# Patient Record
Sex: MALE | Race: WHITE
[De-identification: names, ages, dates, MRNs, and addresses within clinical notes are randomized per-mention and may not be internally consistent; named-entity substitution may affect disease eponyms.]

---

## 2024-04-08 PROBLEM — Z00.00 ENCOUNTER FOR PREVENTIVE HEALTH EXAMINATION: Status: ACTIVE | Noted: 2024-04-08

## 2024-04-18 ENCOUNTER — APPOINTMENT (OUTPATIENT)
Dept: ORTHOPEDIC SURGERY | Facility: CLINIC | Age: 54
End: 2024-04-18

## 2024-04-18 DIAGNOSIS — I10 ESSENTIAL (PRIMARY) HYPERTENSION: ICD-10-CM

## 2024-04-18 DIAGNOSIS — E78.00 PURE HYPERCHOLESTEROLEMIA, UNSPECIFIED: ICD-10-CM

## 2024-04-18 PROCEDURE — 20606 DRAIN/INJ JOINT/BURSA W/US: CPT | Mod: 1L,RT

## 2024-04-18 PROCEDURE — 99204 OFFICE O/P NEW MOD 45 MIN: CPT | Mod: 1L

## 2024-04-18 PROCEDURE — 73630 X-RAY EXAM OF FOOT: CPT | Mod: 1L,RT

## 2024-04-18 PROCEDURE — J3490M: CUSTOM | Mod: 1L

## 2024-04-18 PROCEDURE — 73600 X-RAY EXAM OF ANKLE: CPT | Mod: 1L,RT

## 2024-04-18 PROCEDURE — 76942 ECHO GUIDE FOR BIOPSY: CPT | Mod: 1L,25

## 2024-04-18 RX ORDER — ROSUVASTATIN CALCIUM 5 MG/1
TABLET, FILM COATED ORAL
Refills: 0 | Status: ACTIVE | COMMUNITY

## 2024-04-18 RX ORDER — MELOXICAM 15 MG/1
15 TABLET ORAL DAILY
Qty: 30 | Refills: 2 | Status: ACTIVE | COMMUNITY
Start: 2024-04-18 | End: 1900-01-01

## 2024-04-18 RX ORDER — LOSARTAN POTASSIUM 100 MG/1
TABLET, FILM COATED ORAL
Refills: 0 | Status: ACTIVE | COMMUNITY

## 2024-04-18 NOTE — PHYSICAL EXAM
[NL (40)] : plantar flexion 40 degrees [5___] : eversion 5[unfilled]/5 [2+] : posterior tibialis pulse: 2+ [Normal] : saphenous nerve sensation normal [] : patient ambulates without assistive device [There are no fractures, subluxations or dislocations. No significant abnormalities are seen] : There are no fractures, subluxations or dislocations. No significant abnormalities are seen [Right] : right foot [Weight -] : weightbearing [de-identified] : Subtalar degenerative changes, worse when compared to 2018 films.  [de-identified] : inversion 10 degrees [TWNoteComboBox7] : dorsiflexion 15 degrees [de-identified] : eversion 10 degrees
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
## 2024-04-18 NOTE — PROCEDURE
[FreeTextEntry3] : Patient has tried OTC's including aspirin, Ibuprofen, Aleve, etc or prescription NSAIDS, and/or exercises at home and/or physical therapy without satisfactory response and the risks benefits, and alternatives have been discussed, and verbal consent was obtained.  The risks, benefits and contents of the injection have been discussed.  Risks include but are not limited to allergic reaction, flare reaction, permanent white skin discoloration at the injection site and infection.  The patient understands the risks and agrees to having the injection.  All questions have been answered.  An injection of the right subtalar joint was performed. The indication for this procedure was pain and inflammation. The site was prepped with alcohol and sterile technique used. An injection of 1cc of Lidocaine 1% , 1cc of Ropivacaine  0.5% ,and 1cc of 80mg Methylprednisolone (Depomedrol) was used. Patient tolerated procedure well. Patient was advised to call if redness, pain, or fever occur, apply ice for 15 minutes out of every hour for the next 12-24 hours as tolerated and patient was advised to rest the joint(s) for 3 days.  Ultrasound guidance was indicated for this patient due to erosive arthritis. All ultrasound images have been permanently captured and stored accordingly in our picture archiving and communication system. Visualization of the needle and placement of injection was performed without complication.

## 2024-04-18 NOTE — DISCUSSION/SUMMARY
[de-identified] : Discussed treatment options, both operative and nonoperative.  WB in supportive footwear. Ice to affected area. NSAIDS prn. Discussed steroid vs. visco injections. Bracing discussed. Subtalar arthrodesis discussed.

## 2024-05-16 ENCOUNTER — APPOINTMENT (OUTPATIENT)
Dept: ORTHOPEDIC SURGERY | Facility: CLINIC | Age: 54
End: 2024-05-16

## 2024-05-30 ENCOUNTER — APPOINTMENT (OUTPATIENT)
Dept: ORTHOPEDIC SURGERY | Facility: CLINIC | Age: 54
End: 2024-05-30
Payer: COMMERCIAL

## 2024-05-30 VITALS — HEIGHT: 66 IN | BODY MASS INDEX: 34.87 KG/M2 | WEIGHT: 217 LBS

## 2024-05-30 DIAGNOSIS — M24.471 RECURRENT DISLOCATION, RIGHT ANKLE: ICD-10-CM

## 2024-05-30 DIAGNOSIS — M19.071 PRIMARY OSTEOARTHRITIS, RIGHT ANKLE AND FOOT: ICD-10-CM

## 2024-05-30 PROCEDURE — 99213 OFFICE O/P EST LOW 20 MIN: CPT

## 2024-05-30 NOTE — HISTORY OF PRESENT ILLNESS
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  He had a steroid injection on 4/18/24.  He reports significant improvement and is very happy with his progress. [] : no

## 2024-05-30 NOTE — DISCUSSION/SUMMARY
[de-identified] : Patient is doing much better in regards to his OA.  He is encouraged to do home ROM exercises. He states his subluxating peroneal tendons have been going on for a very long time and do not bother him.  He was explained that this puts him at risk of tendon tearing over time.  Since it is chronic and is not causing symptoms, we will just observe for now.  Follow up if symptoms develop.

## 2024-05-30 NOTE — PHYSICAL EXAM
[NL (40)] : plantar flexion 40 degrees [5___] : eversion 5[unfilled]/5 [2+] : posterior tibialis pulse: 2+ [Normal] : saphenous nerve sensation normal [There are no fractures, subluxations or dislocations. No significant abnormalities are seen] : There are no fractures, subluxations or dislocations. No significant abnormalities are seen [Right] : right foot [Weight -] : weightbearing [de-identified] : Subtalar degenerative changes, worse when compared to 2018 films.  [NL (20)] : eversion 20 degrees [] : non-antalgic [TWNoteComboBox7] : dorsiflexion 15 degrees [de-identified] : inversion 20 degrees

## 2024-07-18 ENCOUNTER — TRANSCRIPTION ENCOUNTER (OUTPATIENT)
Age: 54
End: 2024-07-18

## 2024-09-26 ENCOUNTER — APPOINTMENT (OUTPATIENT)
Dept: ORTHOPEDIC SURGERY | Facility: CLINIC | Age: 54
End: 2024-09-26
Payer: COMMERCIAL

## 2024-09-26 VITALS — WEIGHT: 217 LBS | HEIGHT: 66 IN | BODY MASS INDEX: 34.87 KG/M2

## 2024-09-26 DIAGNOSIS — M24.471 RECURRENT DISLOCATION, RIGHT ANKLE: ICD-10-CM

## 2024-09-26 DIAGNOSIS — M19.071 PRIMARY OSTEOARTHRITIS, RIGHT ANKLE AND FOOT: ICD-10-CM

## 2024-09-26 PROCEDURE — J3490M: CUSTOM | Mod: NC

## 2024-09-26 PROCEDURE — 99213 OFFICE O/P EST LOW 20 MIN: CPT | Mod: 25

## 2024-09-26 PROCEDURE — 20606 DRAIN/INJ JOINT/BURSA W/US: CPT | Mod: RT

## 2024-09-26 NOTE — PROCEDURE
[FreeTextEntry3] : Patient has tried OTC's including aspirin, Ibuprofen, Aleve, etc or prescription NSAIDS, and/or exercises at home and/or physical therapy without satisfactory response. A steroid injection to suppress acute inflammation was discussed. Tthe risks benefits, and alternatives have been discussed, and verbal consent was obtained.   The risks, benefits and contents of the injection have been discussed.  Risks include but are not limited to allergic reaction, flare reaction, permanent white skin discoloration at the injection site and infection.  The patient understands the risks and agrees to having the injection.  All questions have been answered.   An injection of the right subtalar joint was performed. The indication for this procedure was pain and inflammation. The site was prepped with alcohol and sterile technique used. An injection of 1cc of Lidocaine 1% , 1cc of Ropivacaine  0.5% ,and 1cc of 80mg Methylprednisolone (Depomedrol) was used. Patient tolerated procedure well. Patient was advised to call if redness, pain, or fever occur, apply ice for 15 minutes out of every hour for the next 12-24 hours as tolerated and patient was advised to rest the joint(s) for 3 days.   Ultrasound guidance was indicated for this patient due to erosive arthritis . All ultrasound images have been permanently captured and stored accordingly in our picture archiving and communication system. Visualization of the needle and placement of injection was performed without complication.

## 2024-09-26 NOTE — DISCUSSION/SUMMARY
[de-identified] : Patient has a recurrence of his arthritic symptoms. Repeat steroid injection offered and he would like to proceed.  He states his subluxating peroneal tendons have been going on for a very long time and do not bother him.  He was explained that this puts him at risk of tendon tearing over time.  Since it is chronic and is not causing symptoms, we will just observe for now.

## 2024-09-26 NOTE — HISTORY OF PRESENT ILLNESS
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  He had a steroid injection on 4/18/24 which gave great relief.  He states that his pain has been progressively getting worse again over the past weeks.  No recent trauma.  He has a history of subluxating peroneal tendons. [] : no [FreeTextEntry7] : left knee when walking/ limping [FreeTextEntry9] : cortiosne injections

## 2024-09-26 NOTE — PHYSICAL EXAM
[Right] : right foot and ankle [NL (40)] : plantar flexion 40 degrees [NL (20)] : eversion 20 degrees [5___] : eversion 5[unfilled]/5 [2+] : posterior tibialis pulse: 2+ [Normal] : saphenous nerve sensation normal [] : non-antalgic [TWNoteComboBox7] : dorsiflexion 15 degrees [de-identified] : inversion 20 degrees

## 2025-03-19 ENCOUNTER — APPOINTMENT (OUTPATIENT)
Dept: ORTHOPEDIC SURGERY | Facility: CLINIC | Age: 55
End: 2025-03-19
Payer: COMMERCIAL

## 2025-03-19 DIAGNOSIS — M19.071 PRIMARY OSTEOARTHRITIS, RIGHT ANKLE AND FOOT: ICD-10-CM

## 2025-03-19 PROCEDURE — 20606 DRAIN/INJ JOINT/BURSA W/US: CPT | Mod: RT

## 2025-03-19 PROCEDURE — 99213 OFFICE O/P EST LOW 20 MIN: CPT | Mod: 25

## 2025-03-19 PROCEDURE — 73610 X-RAY EXAM OF ANKLE: CPT | Mod: RT

## 2025-03-19 PROCEDURE — J3490M: CUSTOM | Mod: NC,JZ

## 2025-04-17 ENCOUNTER — APPOINTMENT (OUTPATIENT)
Dept: ORTHOPEDIC SURGERY | Facility: CLINIC | Age: 55
End: 2025-04-17
Payer: COMMERCIAL

## 2025-04-17 DIAGNOSIS — M24.471 RECURRENT DISLOCATION, RIGHT ANKLE: ICD-10-CM

## 2025-04-17 DIAGNOSIS — M19.071 PRIMARY OSTEOARTHRITIS, RIGHT ANKLE AND FOOT: ICD-10-CM

## 2025-04-17 PROCEDURE — 99214 OFFICE O/P EST MOD 30 MIN: CPT

## 2025-04-24 ENCOUNTER — APPOINTMENT (OUTPATIENT)
Dept: CT IMAGING | Facility: CLINIC | Age: 55
End: 2025-04-24
Payer: COMMERCIAL

## 2025-04-24 PROCEDURE — 76376 3D RENDER W/INTRP POSTPROCES: CPT | Mod: RT

## 2025-04-24 PROCEDURE — 73700 CT LOWER EXTREMITY W/O DYE: CPT | Mod: RT

## 2025-05-27 ENCOUNTER — OUTPATIENT (OUTPATIENT)
Dept: OUTPATIENT SERVICES | Facility: HOSPITAL | Age: 55
LOS: 1 days | End: 2025-05-27

## 2025-05-27 VITALS
SYSTOLIC BLOOD PRESSURE: 142 MMHG | TEMPERATURE: 99 F | WEIGHT: 197.98 LBS | RESPIRATION RATE: 18 BRPM | DIASTOLIC BLOOD PRESSURE: 88 MMHG | OXYGEN SATURATION: 96 % | HEART RATE: 88 BPM | HEIGHT: 66 IN

## 2025-05-27 DIAGNOSIS — Z98.890 OTHER SPECIFIED POSTPROCEDURAL STATES: Chronic | ICD-10-CM

## 2025-05-27 DIAGNOSIS — I10 ESSENTIAL (PRIMARY) HYPERTENSION: ICD-10-CM

## 2025-05-27 DIAGNOSIS — Z01.818 ENCOUNTER FOR OTHER PREPROCEDURAL EXAMINATION: ICD-10-CM

## 2025-05-27 DIAGNOSIS — M24.471 RECURRENT DISLOCATION, RIGHT ANKLE: ICD-10-CM

## 2025-05-27 LAB
ANION GAP SERPL CALC-SCNC: 5 MMOL/L — SIGNIFICANT CHANGE UP (ref 5–17)
APTT BLD: 33.9 SEC — SIGNIFICANT CHANGE UP (ref 26.1–36.8)
BLD GP AB SCN SERPL QL: SIGNIFICANT CHANGE UP
BUN SERPL-MCNC: 20 MG/DL — SIGNIFICANT CHANGE UP (ref 7–23)
CALCIUM SERPL-MCNC: 9.4 MG/DL — SIGNIFICANT CHANGE UP (ref 8.5–10.1)
CHLORIDE SERPL-SCNC: 109 MMOL/L — HIGH (ref 96–108)
CO2 SERPL-SCNC: 25 MMOL/L — SIGNIFICANT CHANGE UP (ref 22–31)
CREAT SERPL-MCNC: 1.1 MG/DL — SIGNIFICANT CHANGE UP (ref 0.5–1.3)
EGFR: 79 ML/MIN/1.73M2 — SIGNIFICANT CHANGE UP
EGFR: 79 ML/MIN/1.73M2 — SIGNIFICANT CHANGE UP
GLUCOSE SERPL-MCNC: 87 MG/DL — SIGNIFICANT CHANGE UP (ref 70–99)
HCT VFR BLD CALC: 46.2 % — SIGNIFICANT CHANGE UP (ref 39–50)
HGB BLD-MCNC: 15.3 G/DL — SIGNIFICANT CHANGE UP (ref 13–17)
INR BLD: 1 RATIO — SIGNIFICANT CHANGE UP (ref 0.85–1.16)
MCHC RBC-ENTMCNC: 30.4 PG — SIGNIFICANT CHANGE UP (ref 27–34)
MCHC RBC-ENTMCNC: 33.1 G/DL — SIGNIFICANT CHANGE UP (ref 32–36)
MCV RBC AUTO: 91.7 FL — SIGNIFICANT CHANGE UP (ref 80–100)
NRBC BLD AUTO-RTO: 0 /100 WBCS — SIGNIFICANT CHANGE UP (ref 0–0)
PLATELET # BLD AUTO: 155 K/UL — SIGNIFICANT CHANGE UP (ref 150–400)
POTASSIUM SERPL-MCNC: 4 MMOL/L — SIGNIFICANT CHANGE UP (ref 3.5–5.3)
POTASSIUM SERPL-SCNC: 4 MMOL/L — SIGNIFICANT CHANGE UP (ref 3.5–5.3)
PROTHROM AB SERPL-ACNC: 11.6 SEC — SIGNIFICANT CHANGE UP (ref 9.9–13.4)
RBC # BLD: 5.04 M/UL — SIGNIFICANT CHANGE UP (ref 4.2–5.8)
RBC # FLD: 15.4 % — HIGH (ref 10.3–14.5)
SODIUM SERPL-SCNC: 139 MMOL/L — SIGNIFICANT CHANGE UP (ref 135–145)
WBC # BLD: 5.66 K/UL — SIGNIFICANT CHANGE UP (ref 3.8–10.5)
WBC # FLD AUTO: 5.66 K/UL — SIGNIFICANT CHANGE UP (ref 3.8–10.5)

## 2025-05-27 NOTE — H&P PST ADULT - NSICDXPASTSURGICALHX_GEN_ALL_CORE_FT
PAST SURGICAL HISTORY:  H/O carpal tunnel repair     History of ankle surgery     S/P cholecystectomy     S/P hardware removal     S/P ORIF (open reduction internal fixation) fracture

## 2025-05-27 NOTE — H&P PST ADULT - PROBLEM SELECTOR PLAN 1
right foot subtalar arthrodesis and peroneal groove deepening and indicated procedures  Pre-op instructions given, patient verbalized understanding  Chlorhexidine wash instructions given   medical clearance  Anesthesiologist to review PST labs, EKG, required clearances and optimization for surgery.

## 2025-05-27 NOTE — H&P PST ADULT - HISTORY OF PRESENT ILLNESS
54M pmh htn, HLD, recurrent dislocation of right ankle here for Right foot subtalar arthrodesis and peroneal groove deepening with Dr. Chirinos on 06- 54M pmh htn, HLD, recurrent pain of right ankle subluxation of peroneal tendons here for Right foot subtalar arthrodesis and peroneal groove deepening with Dr. Chirinos on 06-

## 2025-05-27 NOTE — H&P PST ADULT - ASSESSMENT
54M pmh htn, HLD, recurrent dislocation of right ankle here for Right foot subtalar arthrodesis and peroneal groove deepening with Dr. Chirinos on 06-  CAPRINI SCORE    AGE RELATED RISK FACTORS                                                             [x ] Age 41-60 years                                            (1 Point)  [ ] Age: 61-74 years                                           (2 Points)                 [ ] Age= 75 years                                                (3 Points)             DISEASE RELATED RISK FACTORS                                                       [ ] Edema in the lower extremities                 (1 Point)                     [ ] Varicose veins                                               (1 Point)                                 [x ] BMI > 25 Kg/m2                                            (1 Point)                                  [ ] Serious infection (ie PNA)                            (1 Point)                     [ ] Lung disease ( COPD, Emphysema)            (1 Point)                                                                          [ ] Acute myocardial infarction                         (1 Point)                  [ ] Congestive heart failure (in the previous month)  (1 Point)         [ ] Inflammatory bowel disease                            (1 Point)                  [ ] Central venous access, PICC or Port               (2 points)       (within the last month)                                                                [ ] Stroke (in the previous month)                        (5 Points)    [ ] Previous or present malignancy                       (2 points)                                                                                                                                                         HEMATOLOGY RELATED FACTORS                                                         [ ] Prior episodes of VTE                                     (3 Points)                     [ ] Positive family history for VTE                      (3 Points)                  [ ] Prothrombin 11152 A                                     (3 Points)                     [ ] Factor V Leiden                                                (3 Points)                        [ ] Lupus anticoagulants                                      (3 Points)                                                           [ ] Anticardiolipin antibodies                              (3 Points)                                                       [ ] High homocysteine in the blood                   (3 Points)                                             [ ] Other congenital or acquired thrombophilia      (3 Points)                                                [ ] Heparin induced thrombocytopenia                  (3 Points)                                        MOBILITY RELATED FACTORS  [ ] Bed rest                                                         (1 Point)  [ ] Plaster cast                                                    (2 points)  [ ] Bed bound for more than 72 hours           (2 Points)    GENDER SPECIFIC FACTORS  [ ] Pregnancy or had a baby within the last month   (1 Point)  [ ] Post-partum < 6 weeks                                   (1 Point)  [ ] Hormonal therapy  or oral contraception   (1 Point)  [ ] History of pregnancy complications              (1 point)  [ ] Unexplained or recurrent              (1 Point)    OTHER RISK FACTORS                                           (1 Point)  [ ] BMI >40, smoking, diabetes requiring insulin, chemotherapy  blood transfusions and length of surgery over 2 hours    SURGERY RELATED RISK FACTORS  [ ]  Section within the last month     (1 Point)  [ ] Minor surgery                                                  (1 Point)  [ ] Arthroscopic surgery                                       (2 Points)  [x ] Planned major surgery lasting more            (2 Points)      than 45 minutes     [ ] Elective hip or knee joint replacement       (5 points)       surgery                                                TRAUMA RELATED RISK FACTORS  [ ] Fracture of the hip, pelvis, or leg                       (5 Points)  [ ] Spinal cord injury resulting in paralysis             (5 points)       (in the previous month)    [ ] Paralysis  (less than 1 month)                             (5 Points)  [ ] Multiple Trauma within 1 month                        (5 Points)    Total Score [    4   ]    Caprini Score 0-2: Low Risk, NO VTE prophylaxis required for most patients, encourage ambulation  Caprini Score 3-6: Moderate Risk , pharmacologic VTE prophylaxis is indicated for most patients (in the absence of contraindications)  Caprini Score Greater than or =7: High risk, pharmocologic VTE prophylaxis indicated for most patients (in the absence of contraindications)

## 2025-05-28 LAB
A1C WITH ESTIMATED AVERAGE GLUCOSE RESULT: 5.4 % — SIGNIFICANT CHANGE UP (ref 4–5.6)
ESTIMATED AVERAGE GLUCOSE: 108 MG/DL — SIGNIFICANT CHANGE UP (ref 68–114)
MRSA PCR RESULT.: SIGNIFICANT CHANGE UP
S AUREUS DNA NOSE QL NAA+PROBE: SIGNIFICANT CHANGE UP
VIT D25+D1,25 OH+D1,25 PNL SERPL-MCNC: 68.7 PG/ML — SIGNIFICANT CHANGE UP (ref 19.9–79.3)

## 2025-06-09 RX ORDER — HYDROCODONE BITARTRATE AND ACETAMINOPHEN 7.5; 325 MG/1; MG/1
7.5-325 TABLET ORAL
Qty: 42 | Refills: 0 | Status: ACTIVE | COMMUNITY
Start: 2025-06-09

## 2025-06-10 ENCOUNTER — OUTPATIENT (OUTPATIENT)
Dept: OUTPATIENT SERVICES | Facility: HOSPITAL | Age: 55
LOS: 1 days | End: 2025-06-10
Payer: COMMERCIAL

## 2025-06-10 ENCOUNTER — TRANSCRIPTION ENCOUNTER (OUTPATIENT)
Age: 55
End: 2025-06-10

## 2025-06-10 ENCOUNTER — RESULT REVIEW (OUTPATIENT)
Age: 55
End: 2025-06-10

## 2025-06-10 ENCOUNTER — APPOINTMENT (OUTPATIENT)
Dept: ORTHOPEDIC SURGERY | Facility: HOSPITAL | Age: 55
End: 2025-06-10
Payer: COMMERCIAL

## 2025-06-10 VITALS
SYSTOLIC BLOOD PRESSURE: 134 MMHG | OXYGEN SATURATION: 99 % | RESPIRATION RATE: 16 BRPM | WEIGHT: 197.98 LBS | HEART RATE: 58 BPM | DIASTOLIC BLOOD PRESSURE: 78 MMHG | TEMPERATURE: 98 F | HEIGHT: 66 IN

## 2025-06-10 VITALS
DIASTOLIC BLOOD PRESSURE: 78 MMHG | RESPIRATION RATE: 16 BRPM | OXYGEN SATURATION: 98 % | SYSTOLIC BLOOD PRESSURE: 120 MMHG

## 2025-06-10 DIAGNOSIS — Z98.890 OTHER SPECIFIED POSTPROCEDURAL STATES: Chronic | ICD-10-CM

## 2025-06-10 PROCEDURE — 73610 X-RAY EXAM OF ANKLE: CPT | Mod: 26

## 2025-06-10 PROCEDURE — 88304 TISSUE EXAM BY PATHOLOGIST: CPT | Mod: 26

## 2025-06-10 PROCEDURE — 27676 REPAIR LOWER LEG TENDONS: CPT | Mod: RT

## 2025-06-10 PROCEDURE — 27658 REPAIR OF LEG TENDON EACH: CPT | Mod: RT

## 2025-06-10 PROCEDURE — 28725 ARTHRODESIS SUBTALAR: CPT | Mod: RT

## 2025-06-10 PROCEDURE — 27680 RELEASE OF LOWER LEG TENDON: CPT | Mod: RT

## 2025-06-10 DEVICE — IMPLANTABLE DEVICE: Type: IMPLANTABLE DEVICE | Site: RIGHT | Status: FUNCTIONAL

## 2025-06-10 DEVICE — GRAFT BONE AUGMENT INJ 3ML SYNTHETIC: Type: IMPLANTABLE DEVICE | Site: RIGHT | Status: FUNCTIONAL

## 2025-06-10 RX ORDER — HYDROMORPHONE/SOD CHLOR,ISO/PF 2 MG/10 ML
0.5 SYRINGE (ML) INJECTION
Refills: 0 | Status: DISCONTINUED | OUTPATIENT
Start: 2025-06-10 | End: 2025-06-10

## 2025-06-10 RX ORDER — B1/B2/B3/B5/B6/B12/VIT C/FOLIC 500-0.5 MG
1 TABLET ORAL
Refills: 0 | DISCHARGE

## 2025-06-10 RX ORDER — LOSARTAN POTASSIUM 100 MG/1
1 TABLET, FILM COATED ORAL
Refills: 0 | DISCHARGE

## 2025-06-10 RX ORDER — ROSUVASTATIN CALCIUM 20 MG/1
1 TABLET, FILM COATED ORAL
Refills: 0 | DISCHARGE

## 2025-06-10 RX ORDER — SODIUM CHLORIDE 9 G/1000ML
1000 INJECTION, SOLUTION INTRAVENOUS
Refills: 0 | Status: DISCONTINUED | OUTPATIENT
Start: 2025-06-10 | End: 2025-06-10

## 2025-06-10 RX ORDER — ONDANSETRON HCL/PF 4 MG/2 ML
4 VIAL (ML) INJECTION ONCE
Refills: 0 | Status: DISCONTINUED | OUTPATIENT
Start: 2025-06-10 | End: 2025-06-10

## 2025-06-10 RX ORDER — ERGOCALCIFEROL 1.25 MG/1
1 CAPSULE ORAL
Refills: 0 | DISCHARGE

## 2025-06-10 RX ORDER — TESTOSTERONE 5.5 MG/1
0 GEL NASAL
Refills: 0 | DISCHARGE

## 2025-06-10 NOTE — ASU DISCHARGE PLAN (ADULT/PEDIATRIC) - FINANCIAL ASSISTANCE
[facility] provides services at a reduced cost to those who are determined to be eligible through [facility]’s financial assistance program. Information regarding [facility]’s financial assistance program can be found by going to [email or web address] or by calling [phone].

## 2025-06-10 NOTE — ASU DISCHARGE PLAN (ADULT/PEDIATRIC) - PROCEDURE
s/p R foot STJ fusion, SPR repair, fib groove depending, fibularis longus and brevis tenosynovectomy

## 2025-06-10 NOTE — ASU PREOP CHECKLIST -  HIBICLENS SHOWER 2 DATE
Problem Dx: HR ALL    Protocol: [id number]  Cycle: IM1  Day: 2  Interval History: no overnight events.    Change from previous past medical, family or social history:	[x] No	[] Yes:    REVIEW OF SYSTEMS  All review of systems negative, except for those marked:  General:		[] Abnormal:  Pulmonary:		[] Abnormal:  Cardiac:		[] Abnormal:  Gastrointestinal:	            [] Abnormal:  ENT:			[] Abnormal:  Renal/Urologic:		[] Abnormal:  Musculoskeletal		[] Abnormal:  Endocrine:		[] Abnormal:  Hematologic:		[x] Abnormal: ALL  Neurologic:		[] Abnormal:  Skin:			[] Abnormal:  Allergy/Immune		[] Abnormal:  Psychiatric:		[] Abnormal:      Allergies    No Known Allergies    Intolerances      chlorhexidine 2% Topical Cloths - Peds 1 Application(s) Topical daily  cholecalciferol Oral Tab/Cap - Peds 1000 Unit(s) Oral daily  clotrimazole  Oral Lozenge - Peds 1 Lozenge Oral two times a day  dextrose 5% + sodium chloride 0.45% - Pediatric 1000 milliLiter(s) IV Continuous <Continuous>  dextrose 5% + sodium chloride 0.45% - Pediatric 1000 milliLiter(s) IV Continuous <Continuous>  famotidine IV Intermittent - Peds 9 milliGRAM(s) IV Intermittent every 12 hours  fenofibrate Oral Tab/Cap - Peds 54 milliGRAM(s) Oral daily  furosemide  IV Push - Peds 18 milliGRAM(s) IV Push once PRN  heparin flush 100 Units/mL IntraVenous Injection - Peds 5 milliLiter(s) IV Push daily PRN  hydrOXYzine IV Intermittent - Peds. 18.5 milliGRAM(s) IV Intermittent every 6 hours PRN  levOCARNitine  Oral Liquid - Peds 1000 milliGRAM(s) Oral two times a day with meals  LORazepam  Oral Liquid - Peds 0.9 milliGRAM(s) Oral every 8 hours  mercaptopurine Oral Tab/Cap - Peds 25 milliGRAM(s) Oral <User Schedule>  OLANZapine  Oral Tab/Cap - Peds 2.5 milliGRAM(s) Oral at bedtime  palonosetron IV Intermittent - Peds 740 MICROGram(s) IV Intermittent every 48 hours  polyethylene glycol 3350 Oral Powder - Peds 17 Gram(s) Oral daily PRN  senna 15 milliGRAM(s) Oral Chewable Tablet - Peds 1 Tablet(s) Chew daily PRN  sodium bicarbonate 8.4% IV Intermittent - Peds 31 milliEquivalent(s) IV Intermittent every 6 hours PRN  sodium chloride 0.9% IV Intermittent (Bolus) - Peds 370 milliLiter(s) IV Bolus once PRN      DIET:  Pediatric Regular    24 Hour Vitals Summary:    T(C): 37 (10-27-22 @ 14:10), Max: 37.2 (10-27-22 @ 05:33)  HR: 96 (10-27-22 @ 14:10) (73 - 96)  BP: 105/74 (10-27-22 @ 14:10) (95/65 - 105/74)  RR: 20 (10-27-22 @ 14:10) (20 - 20)  SpO2: 100% (10-27-22 @ 14:10) (99% - 100%)    24 Hour I&O Summary:    10-26-22 @ 07:01  -  10-27-22 @ 07:00  --------------------------------------------------------  IN: 4020.6 mL / OUT: 3250 mL / NET: 770.6 mL    10-27-22 @ 07:01  -  10-27-22 @ 18:09  --------------------------------------------------------  IN: 2493.5 mL / OUT: 1750 mL / NET: 743.5 mL            Pain Score (0-10):		Lansky/Karnofsky Score:     PATIENT CARE ACCESS  [] Peripheral IV  [] Central Venous Line	[] R	[] L	[] IJ	[] Fem	[] SC			[] Placed:  [] PICC:				[] Broviac		[] Mediport  [] Urinary Catheter, Date Placed:  [] Necessity of urinary, arterial, and venous catheters discussed    PHYSICAL EXAM  All physical exam findings normal, except those marked:  Constitutional:	Normal: well appearing, in no apparent distress  .		[] Abnormal:  Eyes		Normal: no conjunctival injection, symmetric gaze  .		[] Abnormal:  ENT:		Normal: mucus membranes moist, no mouth sores or mucosal bleeding, normal .  .		dentition, symmetric facies.  .		[] Abnormal:               Mucositis NCI grading scale                [x] Grade 0: None                [] Grade 1: (mild) Painless ulcers, erythema, or mild soreness in the absence of lesions                [] Grade 2: (moderate) Painful erythema, oedema, or ulcers but eating or swallowing possible                [] Grade 3: (severe) Painful erythema, odema or ulcers requiring IV hydration                [] Grade 4: (life-threatening) Severe ulceration or requiring parenteral or enteral nutritional support   Neck		Normal: no thyromegaly or masses appreciated  .		[] Abnormal:  Cardiovascular	Normal: regular rate, normal S1, S2, no murmurs, rubs or gallops  .		[] Abnormal:  Respiratory	Normal: clear to auscultation bilaterally, no wheezing  .		[] Abnormal:  Abdominal	Normal: normoactive bowel sounds, soft, NT, no hepatosplenomegaly, no   .		masses  .		[] Abnormal:  		Normal normal genitalia, testes descended  .		[] Abnormal: [x] not done  Lymphatic	Normal: no adenopathy appreciated  .		[] Abnormal:  Extremities	Normal: FROM x4, no cyanosis or edema, symmetric pulses  .		[] Abnormal:  Skin		Normal: normal appearance, no rash, nodules, vesicles, ulcers or erythema  .		[x] Abnormal: Alopecia, Mediport accessed, dressing c/d/i  Neurologic	Normal: no focal deficits, gait normal and normal motor exam.  .		[] Abnormal:  Psychiatric	Normal: affect appropriate  		[] Abnormal:  Musculoskeletal		Normal: full range of motion and no deformities appreciated, no masses   .			and normal strength in all extremities.  .			[] Abnormal:    Lab Results:  CBC    .		Differential:	[x] Automated		[] Manual  Chemistry  10-27    140  |  103  |  <2<L>  ----------------------------<  98  2.9<LL>   |  22  |  0.20<L>    Ca    8.9      27 Oct 2022 15:09  Phos  3.4     10-27  Mg     1.60     10-27    TPro  5.6<L>  /  Alb  3.0<L>  /  TBili  1.0  /  DBili  x   /  AST  172<H>  /  ALT  100<H>  /  AlkPhos  162  10-27    LIVER FUNCTIONS - ( 27 Oct 2022 15:09 )  Alb: 3.0 g/dL / Pro: 5.6 g/dL / ALK PHOS: 162 U/L / ALT: 100 U/L / AST: 172 U/L / GGT: x             Urinalysis Basic - ( 27 Oct 2022 13:06 )    Color: Yellow / Appearance: Clear / S.006 / pH: x  Gluc: x / Ketone: Negative  / Bili: Negative / Urobili: <2 mg/dL   Blood: x / Protein: 30 mg/dL / Nitrite: Negative   Leuk Esterase: Negative / RBC: 0 /HPF / WBC 0 /HPF   Sq Epi: x / Non Sq Epi: 0 /HPF / Bacteria: Negative        MICROBIOLOGY/CULTURES:    RADIOLOGY RESULTS:    Toxicities (with grade)  1.  2.  3.  4.  
hard copy
10-Chay-2025 09:00

## 2025-06-10 NOTE — ASU DISCHARGE PLAN (ADULT/PEDIATRIC) - ASU DC SPECIAL INSTRUCTIONSFT
non-weight bearing to R Lower Extremity   - follow up with Surgeon office in one week   - keep dressing Clean Dry and Intact   - Rest, Elevation  - Take post op medication accordingly, sent to Pharmacy       - 81mg Aspirin BID

## 2025-06-12 LAB — SURGICAL PATHOLOGY STUDY: SIGNIFICANT CHANGE UP

## 2025-06-18 ENCOUNTER — APPOINTMENT (OUTPATIENT)
Dept: ORTHOPEDIC SURGERY | Facility: CLINIC | Age: 55
End: 2025-06-18
Payer: COMMERCIAL

## 2025-06-18 PROBLEM — I10 ESSENTIAL (PRIMARY) HYPERTENSION: Chronic | Status: ACTIVE | Noted: 2025-05-27

## 2025-06-18 PROBLEM — E78.5 HYPERLIPIDEMIA, UNSPECIFIED: Chronic | Status: ACTIVE | Noted: 2025-05-27

## 2025-06-18 PROCEDURE — 99024 POSTOP FOLLOW-UP VISIT: CPT

## 2025-06-18 PROCEDURE — 73610 X-RAY EXAM OF ANKLE: CPT | Mod: RT

## 2025-06-18 PROCEDURE — 29405 APPL SHORT LEG CAST: CPT

## 2025-06-23 DIAGNOSIS — M19.171 POST-TRAUMATIC OSTEOARTHRITIS, RIGHT ANKLE AND FOOT: ICD-10-CM

## 2025-06-23 DIAGNOSIS — I10 ESSENTIAL (PRIMARY) HYPERTENSION: ICD-10-CM

## 2025-06-23 DIAGNOSIS — S96.811A STRAIN OF OTHER SPECIFIED MUSCLES AND TENDONS AT ANKLE AND FOOT LEVEL, RIGHT FOOT, INITIAL ENCOUNTER: ICD-10-CM

## 2025-06-23 DIAGNOSIS — E78.5 HYPERLIPIDEMIA, UNSPECIFIED: ICD-10-CM
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
## 2025-06-23 DIAGNOSIS — M65.971 UNSPECIFIED SYNOVITIS AND TENOSYNOVITIS, RIGHT ANKLE AND FOOT: ICD-10-CM

## 2025-06-23 DIAGNOSIS — Z87.891 PERSONAL HISTORY OF NICOTINE DEPENDENCE: ICD-10-CM

## 2025-07-21 ENCOUNTER — APPOINTMENT (OUTPATIENT)
Dept: ORTHOPEDIC SURGERY | Facility: CLINIC | Age: 55
End: 2025-07-21
Payer: COMMERCIAL

## 2025-07-21 PROCEDURE — 73610 X-RAY EXAM OF ANKLE: CPT | Mod: LT

## 2025-07-21 PROCEDURE — 99024 POSTOP FOLLOW-UP VISIT: CPT

## 2025-08-14 ENCOUNTER — APPOINTMENT (OUTPATIENT)
Dept: ORTHOPEDIC SURGERY | Facility: CLINIC | Age: 55
End: 2025-08-14
Payer: COMMERCIAL

## 2025-08-14 DIAGNOSIS — Z98.1 ARTHRODESIS STATUS: ICD-10-CM

## 2025-08-14 DIAGNOSIS — M19.071 PRIMARY OSTEOARTHRITIS, RIGHT ANKLE AND FOOT: ICD-10-CM

## 2025-08-14 DIAGNOSIS — M24.471 RECURRENT DISLOCATION, RIGHT ANKLE: ICD-10-CM

## 2025-08-14 PROCEDURE — 73610 X-RAY EXAM OF ANKLE: CPT | Mod: RT

## 2025-08-14 PROCEDURE — 99024 POSTOP FOLLOW-UP VISIT: CPT

## 2025-09-11 ENCOUNTER — APPOINTMENT (OUTPATIENT)
Dept: ORTHOPEDIC SURGERY | Facility: CLINIC | Age: 55
End: 2025-09-11
Payer: COMMERCIAL

## 2025-09-11 VITALS — HEIGHT: 66 IN | WEIGHT: 217 LBS | BODY MASS INDEX: 34.87 KG/M2

## 2025-09-11 DIAGNOSIS — Z98.1 ARTHRODESIS STATUS: ICD-10-CM

## 2025-09-11 DIAGNOSIS — M19.071 PRIMARY OSTEOARTHRITIS, RIGHT ANKLE AND FOOT: ICD-10-CM

## 2025-09-11 PROCEDURE — 99213 OFFICE O/P EST LOW 20 MIN: CPT

## 2025-09-11 PROCEDURE — 73610 X-RAY EXAM OF ANKLE: CPT | Mod: RT

## (undated) DEVICE — PACK ORTHO

## (undated) DEVICE — BLADE SURGICAL #15 CARBON

## (undated) DEVICE — Device

## (undated) DEVICE — SUT VICRYL 4-0 18" PS-2 UNDYED

## (undated) DEVICE — VENODYNE/SCD SLEEVE CALF MEDIUM

## (undated) DEVICE — DRAPE C ARM 41X140"

## (undated) DEVICE — DRSG SCOTCH CAST TAPE 5"

## (undated) DEVICE — DRSG ACE BANDAGE 6"

## (undated) DEVICE — DRSG WEBRIL 6"

## (undated) DEVICE — WARMING BLANKET UPPER ADULT

## (undated) DEVICE — SUT VICRYL 2-0 27" CT-2 UNDYED

## (undated) DEVICE — DRAPE EXTREMITY 87" X 128.5"

## (undated) DEVICE — DRAPE 3/4 SHEET W REINFORCEMENT 56X77"

## (undated) DEVICE — SUT NYLON 3-0 18" PS-2

## (undated) DEVICE — FRA-ESU BOVIE FORCE TRIAD T6D04558DX: Type: DURABLE MEDICAL EQUIPMENT

## (undated) DEVICE — DRSG XEROFORM 5 X 9"

## (undated) DEVICE — TOURNIQUET ESMARK 6"

## (undated) DEVICE — FRA-TOURNIQUET 402010120012: Type: DURABLE MEDICAL EQUIPMENT